# Patient Record
Sex: MALE | Race: BLACK OR AFRICAN AMERICAN | NOT HISPANIC OR LATINO | URBAN - METROPOLITAN AREA
[De-identification: names, ages, dates, MRNs, and addresses within clinical notes are randomized per-mention and may not be internally consistent; named-entity substitution may affect disease eponyms.]

---

## 2024-12-10 ENCOUNTER — OFFICE VISIT (OUTPATIENT)
Dept: OPHTHALMOLOGY | Facility: CLINIC | Age: 24
End: 2024-12-10
Payer: COMMERCIAL

## 2024-12-10 DIAGNOSIS — H50.00 ESOTROPIA: ICD-10-CM

## 2024-12-10 DIAGNOSIS — H53.003 BILATERAL AMBLYOPIA: ICD-10-CM

## 2024-12-10 DIAGNOSIS — H52.7 REFRACTIVE ERROR: ICD-10-CM

## 2024-12-10 DIAGNOSIS — H44.23 BILATERAL DEGENERATIVE PROGRESSIVE HIGH MYOPIA: ICD-10-CM

## 2024-12-10 DIAGNOSIS — H47.20 OPTIC ATROPHY: Primary | ICD-10-CM

## 2024-12-10 PROBLEM — H53.013 DEPRIVATION AMBLYOPIA OF BOTH EYES: Status: ACTIVE | Noted: 2024-12-10

## 2024-12-10 PROCEDURE — 1036F TOBACCO NON-USER: CPT | Performed by: OPHTHALMOLOGY

## 2024-12-10 PROCEDURE — 99214 OFFICE O/P EST MOD 30 MIN: CPT | Performed by: OPHTHALMOLOGY

## 2024-12-10 PROCEDURE — 92015 DETERMINE REFRACTIVE STATE: CPT | Performed by: OPHTHALMOLOGY

## 2024-12-10 RX ORDER — CLOBETASOL PROPIONATE 0.5 MG/G
OINTMENT TOPICAL
COMMUNITY
Start: 2024-06-12

## 2024-12-10 RX ORDER — DUPILUMAB 300 MG/2ML
1 INJECTION, SOLUTION SUBCUTANEOUS
COMMUNITY
Start: 2024-09-24

## 2024-12-10 ASSESSMENT — REFRACTION_WEARINGRX
OS_CYLINDER: -2.75
OD_SPHERE: -4.50
OS_SPHERE: -4.00
OD_AXIS: 174
SPECS_TYPE: SVL
OD_CYLINDER: -2.25
OS_AXIS: 008

## 2024-12-10 ASSESSMENT — REFRACTION_MANIFEST
OD_AXIS: 175
OS_SPHERE: -2.50
OS_SPHERE: -4.00
OS_CYLINDER: -3.00
OD_CYLINDER: -3.50
OD_CYLINDER: -2.50
OS_AXIS: 010
OD_SPHERE: -4.00
OD_SPHERE: -2.00
METHOD_AUTOREFRACTION: 1
OS_CYLINDER: -3.25
OD_AXIS: 170
OS_AXIS: 015

## 2024-12-10 ASSESSMENT — VISUAL ACUITY
CORRECTION_TYPE: GLASSES
OS_CC+: +1
OD_CC+: -1
OD_CC: 20/100
OS_CC: 20/40
METHOD: SNELLEN - SINGLE

## 2024-12-10 ASSESSMENT — SLIT LAMP EXAM - LIDS
COMMENTS: 1+ BLEPHARITIS
COMMENTS: 1+ BLEPHARITIS

## 2024-12-10 ASSESSMENT — ENCOUNTER SYMPTOMS
PSYCHIATRIC NEGATIVE: 0
EYES NEGATIVE: 0
CARDIOVASCULAR NEGATIVE: 0
MUSCULOSKELETAL NEGATIVE: 0
RESPIRATORY NEGATIVE: 0
ENDOCRINE NEGATIVE: 0
NEUROLOGICAL NEGATIVE: 0
HEMATOLOGIC/LYMPHATIC NEGATIVE: 0
GASTROINTESTINAL NEGATIVE: 0
CONSTITUTIONAL NEGATIVE: 0
ALLERGIC/IMMUNOLOGIC NEGATIVE: 0

## 2024-12-10 ASSESSMENT — KERATOMETRY
OS_AXISANGLE_DEGREES: 105
OS_K1POWER_DIOPTERS: 43.50
OD_AXISANGLE2_DEGREES: 175
OS_K2POWER_DIOPTERS: 46.50
OD_AXISANGLE_DEGREES: 85
OD_K1POWER_DIOPTERS: 43.25
OD_K2POWER_DIOPTERS: 46.75
OS_AXISANGLE2_DEGREES: 15
METHOD_AUTO_MANUAL: AUTOMATED

## 2024-12-10 ASSESSMENT — TONOMETRY
OD_IOP_MMHG: 22
IOP_METHOD: GOLDMANN APPLANATION
OS_IOP_MMHG: 22

## 2024-12-10 ASSESSMENT — PAIN SCALES - GENERAL: PAINLEVEL_OUTOF10: 0-NO PAIN

## 2024-12-10 ASSESSMENT — EXTERNAL EXAM - RIGHT EYE: OD_EXAM: NORMAL

## 2024-12-10 ASSESSMENT — EXTERNAL EXAM - LEFT EYE: OS_EXAM: NORMAL

## 2024-12-10 ASSESSMENT — PATIENT HEALTH QUESTIONNAIRE - PHQ9
1. LITTLE INTEREST OR PLEASURE IN DOING THINGS: NOT AT ALL
SUM OF ALL RESPONSES TO PHQ9 QUESTIONS 1 AND 2: 0
2. FEELING DOWN, DEPRESSED OR HOPELESS: NOT AT ALL

## 2024-12-10 ASSESSMENT — CUP TO DISC RATIO
OS_RATIO: 0.3
OD_RATIO: 0.3

## 2024-12-10 NOTE — PATIENT INSTRUCTIONS
Glasses prescription given.  Can try contacts if desires.  Follow up in one year for a full exam.

## 2024-12-10 NOTE — PROGRESS NOTES
Subjective   Patient ID: Gabe May is a 24 y.o. male.    Chief Complaint    Annual Exam       HPI    No changes in vision since last visit    Complete exam.  No new changes in health history or meds.  Vision okay.  Interested in cl's.    Last edited by Bonilla Bronson MD on 12/10/2024 10:13 AM.        No current outpatient medications on file. (Ophthalmology pharm classes)       Current Outpatient Medications (Other)   Medication Sig Dispense Refill    clobetasol (Temovate) 0.05 % ointment Apply to affected area twice daily Monday-Friday. Take weekends off. Do not use on face, armpits, neck, or groin.      Dupixent Pen 300 mg/2 mL pen injector Inject 2 mL (300 mg) under the skin every 14 (fourteen) days.         Objective   Base Eye Exam       Visual Acuity (Snellen - Single)         Right Left    Dist cc 20/100 -1 20/40 +1      Correction: Glasses              Tonometry ([facility]mann Applanation, 9:16 AM)         Right Left    Pressure 22 22   Holding lids and squeezing OU.               Pupils         Dark Shape React APD    Right 5 Round 2 None    Left 5 Round 2 None              Extraocular Movement         Right Left     Full Full              Dilation       Both eyes: 1% Tropic 2.5% Phen @ 9:17 AM                  Additional Tests       Keratometry (Automated)         K1 Axis K2 Axis    Right 43.25 175 46.75 85    Left 43.50 15 46.50 105                  Slit Lamp and Fundus Exam       External Exam         Right Left    External Normal Normal              Slit Lamp Exam         Right Left    Lids/Lashes 1+ Blepharitis 1+ Blepharitis    Conjunctiva/Sclera normal bulbar and palepbral conjunctiva normal bulbar and palepbral conjunctiva    Cornea normal epi/stroma/endo and tear film normal epi/stroma/endo and tear film    Anterior Chamber normal anterior chamber, deep and quiet normal anterior chamber, deep and quiet    Iris iris normal iris normal    Lens normal clear lens capsule/cortex/nucleus normal clear  lens capsule/cortex/nucleus    Anterior Vitreous vitreous clear and normal vitreous clear and normal              Fundus Exam         Right Left    Disc Brief glimpse, Peripapillary atrophy Brief glimpse, Peripapillary atrophy    C/D Ratio 0.3 0.3    Macula chorioretinal scar chorioretinal scar    Vessels normal vessels normal vessels    Periphery peripheral retinal degeneration peripheral retinal degeneration                  Refraction       Wearing Rx         Sphere Cylinder Axis    Right -4.50 -2.25 174    Left -4.00 -2.75 008      Type: SVL              Manifest Refraction (Auto)         Sphere Cylinder [place] Dist [facility]    Right -2.00 -3.50 175     Left -2.50 -3.25 015       Pupillary Distance: 64              Manifest Refraction #2 (Subjective)         Sphere Cylinder [place] Dist [place]    Right -4.00 -2.50 170 20/100 -1    Left -4.00 -3.00 010 20/40      Pupillary Distance: 64              Final Rx         Sphere Cylinder [place] Dist [facility]    Right -4.00 -2.50 170 20/100    Left -4.00 -3.00 010 20/40      Type: distance    Expiration Date: 12/10/2026    Pupillary Distance: 64                    Assessment/Plan   Problem List Items Addressed This Visit          Eye/Vision problems    Optic atrophy - Primary    Bilateral degenerative progressive high myopia     Spec rx given.  Interested in cl's.  F/u one year full.           Esotropia    Bilateral amblyopia    Refractive error

## 2025-12-15 ENCOUNTER — APPOINTMENT (OUTPATIENT)
Dept: OPHTHALMOLOGY | Facility: CLINIC | Age: 25
End: 2025-12-15
Payer: COMMERCIAL